# Patient Record
Sex: FEMALE | Race: BLACK OR AFRICAN AMERICAN | NOT HISPANIC OR LATINO | URBAN - METROPOLITAN AREA
[De-identification: names, ages, dates, MRNs, and addresses within clinical notes are randomized per-mention and may not be internally consistent; named-entity substitution may affect disease eponyms.]

---

## 2023-03-18 ENCOUNTER — EMERGENCY (EMERGENCY)
Facility: HOSPITAL | Age: 23
LOS: 1 days | Discharge: HOME | End: 2023-03-18
Attending: EMERGENCY MEDICINE
Payer: COMMERCIAL

## 2023-03-18 VITALS
RESPIRATION RATE: 20 BRPM | OXYGEN SATURATION: 98 % | SYSTOLIC BLOOD PRESSURE: 109 MMHG | HEIGHT: 62 IN | HEART RATE: 83 BPM | DIASTOLIC BLOOD PRESSURE: 73 MMHG | WEIGHT: 115.08 LBS | TEMPERATURE: 98 F

## 2023-03-18 PROCEDURE — 99282 EMERGENCY DEPT VISIT SF MDM: CPT

## 2023-03-18 PROCEDURE — 99284 EMERGENCY DEPT VISIT MOD MDM: CPT

## 2023-03-18 RX ORDER — DOCUSATE SODIUM 100 MG
1 CAPSULE ORAL
Qty: 14 | Refills: 0
Start: 2023-03-18 | End: 2023-03-24

## 2023-03-18 NOTE — ED PROVIDER NOTE - NSFOLLOWUPINSTRUCTIONS_ED_ALL_ED_FT
Please take all medication as prescribed  Please follow up with your rectal surgeon within 2-3 days  Return to the emergency room if you are experiencing any new or worsening symptoms

## 2023-03-18 NOTE — ED PROVIDER NOTE - PATIENT PORTAL LINK FT
You can access the FollowMyHealth Patient Portal offered by [facility] by registering at the following website: [email or web address]. By joining [employer]’s FollowMyHealth portal, you will also be able to view your health information using other applications (apps) compatible with our system.

## 2023-03-18 NOTE — ED PROVIDER NOTE - CARE PROVIDER_API CALL
Ryan Huff)  Surgery  [address] [address]  Phone: [phone]  Fax: [phone]  Follow Up Time: 1-3 Days

## 2023-03-18 NOTE — ED PROVIDER NOTE - NS ED ATTENDING STATEMENT MOD
This was a shared visit with the NOMAN. I reviewed and verified the documentation and independently performed the documented:

## 2023-03-18 NOTE — ED PROVIDER NOTE - OBJECTIVE STATEMENT
23 y/o F with no reported PMHx/PSHx p/w evaluation of discomfort in her rectum x 2 days. Pt reports having a BM yesterday morning and about 1 hour later noticed some discomfort in her rectum. Her mother called her GI doctor and was told to come to the ED for possible reduction of rectal prolapse. Denies any rectal bleeding, melena, bloody stools, constipation, abdominal pain, back pain, dysuria/hematuria, rash, sob, cp, fever/chills, n/v and with no other c/o. 22 year old F with no reported PMHx/PSHx p/w evaluation of discomfort in her rectum x 2 days. Pt reports having a BM yesterday morning and about 1 hour later noticed some discomfort in her rectum. Her mother called her GI doctor and was told to come to the ED for possible reduction of rectal prolapse. Denies any rectal bleeding, melena, bloody stools, constipation, abdominal pain, back pain, dysuria/hematuria, rash, sob, cp, fever/chills, n/v and with no other c/o.

## 2023-03-18 NOTE — ED PROVIDER NOTE - PHYSICAL EXAMINATION
Constitutional: Awake, alert and oriented. In no acute distress. Well appearing.  HEENT: NC/AT. Moist mucous membranes.  Eyes: No scleral icterus. EOMI.  Neck:. Soft and supple. Full ROM without pain.  Cardiac: Regular rate and regular rhythm.   Respiratory: Speaking in full sentences. No evidence of respiratory distress. No wheezes, rales or rhonchi.  Abdomen: Soft, non-distended and non tender Normal bowel sounds in all 4 quadrants. No guarding or rebound. No CVAT  Rectum: (+) 1cm reducible prolapsed rectum, non tender. No hemorrhoids, no thrombosed hemorrhoids, no anal fissure, no rectal bleeding or rash.   Back: Spine midline and non-tender.   Skin: No rashes, abrasions or lacerations.  Lymph: No cervical lymphadenopathy.  Neuro: Awake, alert & oriented x 3. Moves all extremities symmetrically.   Psych: calm, cooperative, normal affect
